# Patient Record
Sex: FEMALE | Race: BLACK OR AFRICAN AMERICAN | Employment: OTHER | URBAN - METROPOLITAN AREA
[De-identification: names, ages, dates, MRNs, and addresses within clinical notes are randomized per-mention and may not be internally consistent; named-entity substitution may affect disease eponyms.]

---

## 2022-11-07 ENCOUNTER — APPOINTMENT (OUTPATIENT)
Dept: CT IMAGING | Age: 67
End: 2022-11-07
Attending: EMERGENCY MEDICINE
Payer: MEDICARE

## 2022-11-07 ENCOUNTER — HOSPITAL ENCOUNTER (EMERGENCY)
Age: 67
Discharge: ANOTHER HOSPITAL | End: 2022-11-08
Attending: EMERGENCY MEDICINE
Payer: MEDICARE

## 2022-11-07 ENCOUNTER — APPOINTMENT (OUTPATIENT)
Dept: GENERAL RADIOLOGY | Age: 67
End: 2022-11-07
Attending: EMERGENCY MEDICINE
Payer: MEDICARE

## 2022-11-07 DIAGNOSIS — G93.89 BRAIN MASS: ICD-10-CM

## 2022-11-07 DIAGNOSIS — R56.9 SEIZURE (HCC): Primary | ICD-10-CM

## 2022-11-07 DIAGNOSIS — G93.40 ENCEPHALOPATHY: ICD-10-CM

## 2022-11-07 DIAGNOSIS — C34.91 METASTATIC PRIMARY LUNG CANCER, RIGHT (HCC): ICD-10-CM

## 2022-11-07 LAB
ALBUMIN SERPL-MCNC: 3.3 G/DL (ref 3.4–5)
ALBUMIN/GLOB SERPL: 0.8 {RATIO} (ref 0.8–1.7)
ALP SERPL-CCNC: 99 U/L (ref 45–117)
ALT SERPL-CCNC: 27 U/L (ref 13–56)
AMPHET UR QL SCN: NEGATIVE
ANION GAP SERPL CALC-SCNC: 9 MMOL/L (ref 3–18)
APAP SERPL-MCNC: <2 UG/ML (ref 10–30)
APPEARANCE UR: CLEAR
AST SERPL-CCNC: 35 U/L (ref 10–38)
BACTERIA URNS QL MICRO: NEGATIVE /HPF
BARBITURATES UR QL SCN: NEGATIVE
BASOPHILS # BLD: 0 K/UL (ref 0–0.1)
BASOPHILS NFR BLD: 0 % (ref 0–2)
BENZODIAZ UR QL: POSITIVE
BILIRUB DIRECT SERPL-MCNC: <0.1 MG/DL (ref 0–0.2)
BILIRUB SERPL-MCNC: 0.2 MG/DL (ref 0.2–1)
BILIRUB UR QL: NEGATIVE
BUN SERPL-MCNC: 21 MG/DL (ref 7–18)
BUN/CREAT SERPL: 17 (ref 12–20)
CALCIUM SERPL-MCNC: 9.3 MG/DL (ref 8.5–10.1)
CANNABINOIDS UR QL SCN: NEGATIVE
CHLORIDE SERPL-SCNC: 106 MMOL/L (ref 100–111)
CO2 SERPL-SCNC: 23 MMOL/L (ref 21–32)
COCAINE UR QL SCN: NEGATIVE
COLOR UR: YELLOW
COVID-19 RAPID TEST, COVR: NOT DETECTED
CREAT SERPL-MCNC: 1.27 MG/DL (ref 0.6–1.3)
DIFFERENTIAL METHOD BLD: ABNORMAL
EOSINOPHIL # BLD: 0.1 K/UL (ref 0–0.4)
EOSINOPHIL NFR BLD: 1 % (ref 0–5)
EPITH CASTS URNS QL MICRO: NORMAL /LPF (ref 0–5)
ERYTHROCYTE [DISTWIDTH] IN BLOOD BY AUTOMATED COUNT: 12.4 % (ref 11.6–14.5)
ETHANOL SERPL-MCNC: <3 MG/DL (ref 0–3)
GLOBULIN SER CALC-MCNC: 4.2 G/DL (ref 2–4)
GLUCOSE BLD STRIP.AUTO-MCNC: 176 MG/DL (ref 70–110)
GLUCOSE SERPL-MCNC: 123 MG/DL (ref 74–99)
GLUCOSE UR STRIP.AUTO-MCNC: NEGATIVE MG/DL
GRAN CASTS URNS QL MICRO: NORMAL /LPF
HCT VFR BLD AUTO: 45.8 % (ref 35–45)
HDSCOM,HDSCOM: ABNORMAL
HGB BLD-MCNC: 14.3 G/DL (ref 12–16)
HGB UR QL STRIP: ABNORMAL
HYALINE CASTS URNS QL MICRO: NORMAL /LPF (ref 0–2)
IMM GRANULOCYTES # BLD AUTO: 0 K/UL (ref 0–0.04)
IMM GRANULOCYTES NFR BLD AUTO: 0 % (ref 0–0.5)
KETONES UR QL STRIP.AUTO: NEGATIVE MG/DL
LACTATE BLD-SCNC: 1.55 MMOL/L (ref 0.4–2)
LEUKOCYTE ESTERASE UR QL STRIP.AUTO: NEGATIVE
LYMPHOCYTES # BLD: 7 K/UL (ref 0.9–3.6)
LYMPHOCYTES NFR BLD: 49 % (ref 21–52)
MCH RBC QN AUTO: 30.4 PG (ref 24–34)
MCHC RBC AUTO-ENTMCNC: 31.2 G/DL (ref 31–37)
MCV RBC AUTO: 97.2 FL (ref 78–100)
METHADONE UR QL: NEGATIVE
MONOCYTES # BLD: 0.4 K/UL (ref 0.05–1.2)
MONOCYTES NFR BLD: 3 % (ref 3–10)
NEUTS SEG # BLD: 6.7 K/UL (ref 1.8–8)
NEUTS SEG NFR BLD: 47 % (ref 40–73)
NITRITE UR QL STRIP.AUTO: NEGATIVE
NRBC # BLD: 0 K/UL (ref 0–0.01)
NRBC BLD-RTO: 0 PER 100 WBC
OPIATES UR QL: NEGATIVE
PCP UR QL: NEGATIVE
PH UR STRIP: 5.5 [PH] (ref 5–8)
PLATELET # BLD AUTO: 211 K/UL (ref 135–420)
PLATELET COMMENTS,PCOM: ABNORMAL
PMV BLD AUTO: 11.1 FL (ref 9.2–11.8)
POTASSIUM SERPL-SCNC: 4.2 MMOL/L (ref 3.5–5.5)
PROT SERPL-MCNC: 7.5 G/DL (ref 6.4–8.2)
PROT UR STRIP-MCNC: 300 MG/DL
RBC # BLD AUTO: 4.71 M/UL (ref 4.2–5.3)
RBC #/AREA URNS HPF: NORMAL /HPF (ref 0–5)
RBC MORPH BLD: ABNORMAL
SALICYLATES SERPL-MCNC: 2.4 MG/DL (ref 2.8–20)
SODIUM SERPL-SCNC: 138 MMOL/L (ref 136–145)
SOURCE, COVRS: NORMAL
SP GR UR REFRACTOMETRY: 1.02 (ref 1–1.03)
TROPONIN-HIGH SENSITIVITY: 7 NG/L (ref 0–54)
UROBILINOGEN UR QL STRIP.AUTO: 1 EU/DL (ref 0.2–1)
WBC # BLD AUTO: 14.2 K/UL (ref 4.6–13.2)
WBC URNS QL MICRO: NORMAL /HPF (ref 0–4)

## 2022-11-07 PROCEDURE — 71260 CT THORAX DX C+: CPT

## 2022-11-07 PROCEDURE — 87040 BLOOD CULTURE FOR BACTERIA: CPT

## 2022-11-07 PROCEDURE — 83605 ASSAY OF LACTIC ACID: CPT

## 2022-11-07 PROCEDURE — 70450 CT HEAD/BRAIN W/O DYE: CPT

## 2022-11-07 PROCEDURE — 70496 CT ANGIOGRAPHY HEAD: CPT

## 2022-11-07 PROCEDURE — 80307 DRUG TEST PRSMV CHEM ANLYZR: CPT

## 2022-11-07 PROCEDURE — 74011000258 HC RX REV CODE- 258: Performed by: EMERGENCY MEDICINE

## 2022-11-07 PROCEDURE — 74011250637 HC RX REV CODE- 250/637: Performed by: EMERGENCY MEDICINE

## 2022-11-07 PROCEDURE — 80048 BASIC METABOLIC PNL TOTAL CA: CPT

## 2022-11-07 PROCEDURE — 96376 TX/PRO/DX INJ SAME DRUG ADON: CPT

## 2022-11-07 PROCEDURE — 82077 ASSAY SPEC XCP UR&BREATH IA: CPT

## 2022-11-07 PROCEDURE — 96365 THER/PROPH/DIAG IV INF INIT: CPT

## 2022-11-07 PROCEDURE — 80076 HEPATIC FUNCTION PANEL: CPT

## 2022-11-07 PROCEDURE — 96366 THER/PROPH/DIAG IV INF ADDON: CPT

## 2022-11-07 PROCEDURE — 99285 EMERGENCY DEPT VISIT HI MDM: CPT

## 2022-11-07 PROCEDURE — 93005 ELECTROCARDIOGRAM TRACING: CPT

## 2022-11-07 PROCEDURE — 85025 COMPLETE CBC W/AUTO DIFF WBC: CPT

## 2022-11-07 PROCEDURE — 96368 THER/DIAG CONCURRENT INF: CPT

## 2022-11-07 PROCEDURE — 80179 DRUG ASSAY SALICYLATE: CPT

## 2022-11-07 PROCEDURE — 71045 X-RAY EXAM CHEST 1 VIEW: CPT

## 2022-11-07 PROCEDURE — 74011000636 HC RX REV CODE- 636: Performed by: EMERGENCY MEDICINE

## 2022-11-07 PROCEDURE — 96375 TX/PRO/DX INJ NEW DRUG ADDON: CPT

## 2022-11-07 PROCEDURE — 74011250636 HC RX REV CODE- 250/636: Performed by: EMERGENCY MEDICINE

## 2022-11-07 PROCEDURE — 87635 SARS-COV-2 COVID-19 AMP PRB: CPT

## 2022-11-07 PROCEDURE — 84484 ASSAY OF TROPONIN QUANT: CPT

## 2022-11-07 PROCEDURE — 81001 URINALYSIS AUTO W/SCOPE: CPT

## 2022-11-07 PROCEDURE — 80143 DRUG ASSAY ACETAMINOPHEN: CPT

## 2022-11-07 PROCEDURE — 96367 TX/PROPH/DG ADDL SEQ IV INF: CPT

## 2022-11-07 PROCEDURE — 82962 GLUCOSE BLOOD TEST: CPT

## 2022-11-07 RX ORDER — DEXAMETHASONE SODIUM PHOSPHATE 4 MG/ML
4 INJECTION, SOLUTION INTRA-ARTICULAR; INTRALESIONAL; INTRAMUSCULAR; INTRAVENOUS; SOFT TISSUE
Status: COMPLETED | OUTPATIENT
Start: 2022-11-07 | End: 2022-11-07

## 2022-11-07 RX ORDER — LEVETIRACETAM 10 MG/ML
1000 INJECTION INTRAVASCULAR EVERY 12 HOURS
Status: DISCONTINUED | OUTPATIENT
Start: 2022-11-08 | End: 2022-11-07

## 2022-11-07 RX ORDER — LORAZEPAM 2 MG/ML
1 INJECTION INTRAMUSCULAR
Status: DISCONTINUED | OUTPATIENT
Start: 2022-11-07 | End: 2022-11-08 | Stop reason: HOSPADM

## 2022-11-07 RX ORDER — DEXAMETHASONE SODIUM PHOSPHATE 4 MG/ML
4 INJECTION, SOLUTION INTRA-ARTICULAR; INTRALESIONAL; INTRAMUSCULAR; INTRAVENOUS; SOFT TISSUE EVERY 6 HOURS
Status: DISCONTINUED | OUTPATIENT
Start: 2022-11-08 | End: 2022-11-08 | Stop reason: HOSPADM

## 2022-11-07 RX ORDER — LEVETIRACETAM 10 MG/ML
1000 INJECTION INTRAVASCULAR EVERY 12 HOURS
Status: DISCONTINUED | OUTPATIENT
Start: 2022-11-08 | End: 2022-11-08 | Stop reason: HOSPADM

## 2022-11-07 RX ORDER — MORPHINE SULFATE 4 MG/ML
4 INJECTION INTRAVENOUS
Status: COMPLETED | OUTPATIENT
Start: 2022-11-07 | End: 2022-11-08

## 2022-11-07 RX ORDER — FENTANYL CITRATE 50 UG/ML
25 INJECTION, SOLUTION INTRAMUSCULAR; INTRAVENOUS ONCE
Status: COMPLETED | OUTPATIENT
Start: 2022-11-07 | End: 2022-11-07

## 2022-11-07 RX ORDER — LORAZEPAM 2 MG/ML
1 INJECTION INTRAMUSCULAR ONCE
Status: COMPLETED | OUTPATIENT
Start: 2022-11-07 | End: 2022-11-07

## 2022-11-07 RX ORDER — FENTANYL CITRATE 50 UG/ML
50 INJECTION, SOLUTION INTRAMUSCULAR; INTRAVENOUS
Status: COMPLETED | OUTPATIENT
Start: 2022-11-07 | End: 2022-11-07

## 2022-11-07 RX ORDER — SODIUM CHLORIDE 0.9 % (FLUSH) 0.9 %
5-10 SYRINGE (ML) INJECTION AS NEEDED
Status: DISCONTINUED | OUTPATIENT
Start: 2022-11-07 | End: 2022-11-08 | Stop reason: HOSPADM

## 2022-11-07 RX ORDER — LEVETIRACETAM 10 MG/ML
1000 INJECTION INTRAVASCULAR EVERY 12 HOURS
Status: DISCONTINUED | OUTPATIENT
Start: 2022-11-07 | End: 2022-11-07

## 2022-11-07 RX ORDER — ASPIRIN 300 MG/1
300 SUPPOSITORY RECTAL ONCE
Status: COMPLETED | OUTPATIENT
Start: 2022-11-07 | End: 2022-11-07

## 2022-11-07 RX ADMIN — LORAZEPAM 1 MG: 2 INJECTION INTRAMUSCULAR; INTRAVENOUS at 17:31

## 2022-11-07 RX ADMIN — LORAZEPAM 1 MG: 2 INJECTION INTRAMUSCULAR; INTRAVENOUS at 23:28

## 2022-11-07 RX ADMIN — VANCOMYCIN HYDROCHLORIDE 1000 MG: 1 INJECTION, POWDER, LYOPHILIZED, FOR SOLUTION INTRAVENOUS at 21:24

## 2022-11-07 RX ADMIN — DEXAMETHASONE SODIUM PHOSPHATE 4 MG: 4 INJECTION, SOLUTION INTRAMUSCULAR; INTRAVENOUS at 18:14

## 2022-11-07 RX ADMIN — SODIUM CHLORIDE 1000 ML: 9 INJECTION, SOLUTION INTRAVENOUS at 20:17

## 2022-11-07 RX ADMIN — ASPIRIN 300 MG: 300 SUPPOSITORY RECTAL at 17:23

## 2022-11-07 RX ADMIN — DEXAMETHASONE SODIUM PHOSPHATE 4 MG: 4 INJECTION, SOLUTION INTRAMUSCULAR; INTRAVENOUS at 23:55

## 2022-11-07 RX ADMIN — FENTANYL CITRATE 50 MCG: 50 INJECTION, SOLUTION INTRAMUSCULAR; INTRAVENOUS at 21:42

## 2022-11-07 RX ADMIN — PIPERACILLIN AND TAZOBACTAM 4.5 G: 4; .5 INJECTION, POWDER, FOR SOLUTION INTRAVENOUS at 20:45

## 2022-11-07 RX ADMIN — LORAZEPAM 1 MG: 2 INJECTION INTRAMUSCULAR; INTRAVENOUS at 18:27

## 2022-11-07 RX ADMIN — FENTANYL CITRATE 25 MCG: 50 INJECTION, SOLUTION INTRAMUSCULAR; INTRAVENOUS at 17:18

## 2022-11-07 RX ADMIN — SODIUM CHLORIDE 632 ML: 9 INJECTION, SOLUTION INTRAVENOUS at 20:47

## 2022-11-07 RX ADMIN — SODIUM CHLORIDE 1500 MG: 9 INJECTION, SOLUTION INTRAVENOUS at 17:21

## 2022-11-07 RX ADMIN — IOPAMIDOL 120 ML: 755 INJECTION, SOLUTION INTRAVENOUS at 18:57

## 2022-11-07 NOTE — ED PROVIDER NOTES
EMERGENCY DEPARTMENT HISTORY AND PHYSICAL EXAM    5:19 PM      Date: 11/7/2022  Patient Name: Yasir Ziegler  ((((((((  Janine Dew  1955))))))))    History of Presenting Illness     Chief Complaint   Patient presents with    Altered mental status                 ((((((((  Janine Dew  1955))))))))    History Provided By: Patient, Patient's Daughter, and EMS  Location/Duration/Severity/Modifying factors   HPI  This is a 70-year-old female brought to the emergency department for evaluation status post seizure-like activity. According to the patient's daughter she has been in her usual state of health until she was dropped off at work at 130. This last time the daughter saw her. EMS was called out and patient's coworkers found her with slurred speech and questionable right-sided facial droop at 350 in the afternoon. EMS arrived. Upon seeing the said the patient was encephalopathic. Acting confused and reaching for objects that were not there. They do report that the patient had a witnessed seizure-like activity of approximately 30 seconds. This was followed by a postictal period patient was given 2 half milligrams of Versed and was brought to the emergency department for evaluation. No reported trauma or fevers. Patient's daughter was present during the initial exam but she was on the phone and states the patient had been otherwise acting normally no recent change of diet or medications trauma fevers or complaints. No reported GI bleeds.   And never had similar symptoms of this in the past.      PCP: None    Current Facility-Administered Medications   Medication Dose Route Frequency Provider Last Rate Last Admin    vancomycin (VANCOCIN) 1,000 mg in 0.9% sodium chloride 250 mL (VIAL-MATE)  1,000 mg IntraVENous Q24H Deven NEWBY  mL/hr at 11/08/22 0646 1,000 mg at 11/08/22 0646    morphine injection 4 mg  4 mg IntraVENous NOW Maxine Bello MD        sodium chloride (NS) flush 5-10 mL  5-10 mL IntraVENous PRN Deven Lights K, DO        piperacillin-tazobactam (ZOSYN) 3.375 g in 0.9% sodium chloride (MBP/ADV) 100 mL MBP  3.375 g IntraVENous Q8H Deven Lights K, DO   IV Completed at 11/08/22 0647    LORazepam (ATIVAN) injection 1 mg  1 mg IntraVENous Q3H PRN Deven Lights K, DO   1 mg at 11/08/22 0653    dexamethasone (DECADRON) 4 mg/mL injection 4 mg  4 mg IntraVENous Q6H Deven Lights K, DO   4 mg at 11/08/22 9616    levETIRAcetam in saline (iso-os) (KEPPRA) infusion 1,000 mg  1,000 mg IntraVENous Q12H Rommie Louie, DO   IV Completed at 11/08/22 6962       Past History     Past Medical History:  History reviewed. No pertinent past medical history. Past Surgical History:  Past Surgical History:   Procedure Laterality Date    NEUROLOGICAL PROCEDURE UNLISTED      [place] CARDIAC SURG PROCEDURE UNLIST         Family History:  History reviewed. No pertinent family history. Social History:  Social History     Tobacco Use    Smoking status: Never     Passive exposure: Never    Smokeless tobacco: Never   Vaping Use    Vaping Use: Never used   Substance Use Topics    Alcohol use: Never    Drug use: Never       Allergies:  No Known Allergies      Review of Systems       Review of Systems  Unable to obtain due to underlying patient condition. Physical Exam   Visit Vitals  BP (!) 162/81   Pulse 79   Temp 98.4 °F (36.9 °C)   Resp 19   Ht 5' 6\" (1.676 m)   Wt 54.4 kg (120 lb)   SpO2 99%   BMI 19.37 kg/m²         Physical Exam  General: Very thin elderly female in no acute distress  HEENT: Pupils equal round reactive light, moist mucous membranes.   Extraocular movements intact  Neck: Supple, no meningismus  Chest: Tachycardic rate rhythm no murmurs rubs or gallops  Abdomen: Soft nontender nondistended no tenderness in right upper quadrant no pulsatile masses to deep palpation  Lungs: Clear to auscultation bilaterally no wheeze rales rhonchi or stridor  Back: No cervical thoracic or lumbosacral bony tenderness or step-offs. No CVA tenderness. Extremities: No unilateral leg swelling no obvious deformities or dislocations  Integument: No apparent rashes erythema contusions or petechiae  Neurologic: Cranial nerves II through XII grossly intact  Psychiatric: Alert responsive to pain and verbal stimulation. Answering questions yes and no. Diagnostic Study Results     Labs -  No results found for this or any previous visit (from the past 12 hour(s)). Radiologic Studies -   CTA HEAD NECK W CONT         CT CHEST ABD PELV W CONT   Final Result   1. Large right perihilar upper lobe lung mass is most likely primary lung   cancer.   -Confluent right suprahilar adenopathy      2. Innumerable pulmonary nodules bilaterally are most consistent with metastatic   disease. 3. Small-to-moderate right pleural effusion. 4. Extensive emphysema. 5. Medial right breast mass measuring 1.3 cm. Recommend further evaluation with   mammogram and ultrasound. 6. No definite evidence for metastatic disease in the abdomen or pelvis. 7. Moderate height loss of the T5 and T7 vertebral bodies, age-indeterminate,   recommend correlation for point tenderness. XR CHEST PORT   Final Result   1. Right upper lung field mass and multiple reticulonodular changes. Further   evaluation with CT is recommended. CT HEAD WO CONT   Final Result   2.6 cm mass in the left frontal lobe, with surrounding edema, creating moderate   local mass effect. No midline shift. Additional focus of edema more posteriorly in the left frontal lobe. No definite   mass evident here by noncontrast CT, but concerning for an occult mass. Recommend further evaluation with contrast-enhanced MRI. Augustine Flores M.D. discussed the above with Dr. Jayde Torre at 4:50 PM on   11/7/2022. Findings were acknowledged and understood. Medical Decision Making   I am the first provider for this patient.     I reviewed the vital signs, available nursing notes, past medical history, past surgical history, family history and social history. Vital Signs-Reviewed the patient's vital signs. EKG:   Patient's EKG as interpreted by me sinus tachycardia with a right bundle branch block rate 105   no ST elevation no ST depression I appreciate no acute ischemia or infarction on this EKG no old EKGs with which to compare    Records Reviewed: Nursing Notes and Old Medical Records (Time of Review: 5:19 PM)    ED Course: Progress Notes, Reevaluation, and Consults:    ED Course as of 22 1040   Mon 2022   1300 [employer] was endorsed to me at the change of shift. Patient presented with altered mental status and a witnessed seizure by EMS. Patient is been persistently encephalopathic since she is arrived here. She has received Keppra already for her seizure today. CT showed Mass with vasogenic edema. [JORDAN]   193 Full name: Jonathan Strange   : 1955 [JORDAN]    Pending CT scan results. Patient's family expresses interest in transfer to [facility] is a primary choice given patient's history of prior [employer], or potentially [facility] if that is an option. Still waiting for CT scans and would reach out to the access center to see if [facility] it will be an option for the patient if they have neurosurgical coverage. [JORDAN]    Consult placed to [facility], with family's preference being the [facility].  We will also reach out to [facility] if [facility] does not have any availability or neurosurgical coverage. If neither are suitable we may have to extend the search further out. [JORDAN]    On reassessment the patient is still encephalopathic. She is resting, tachypneic seemingly protecting her airway however. Did not interrogate the patient's mental status at this time.  [JORDAN]   3 Discussed with neurosurgeon at [facility], Dr. Harris Mercer Yousuf. Recommended Decadron 4 mg every 6 hours, continue Keppra 1000mg twice a day. He recommended transfer to [facility] although without clear explanation as to why [facility]. We did reach out to [facility] and they did not have any neuro ICU or neuro telemetry beds available. We will try [facility] the bed status is unknown. He recommended any facility with neurosurgery capabilities would be suitable. [JORDAN]   1333 Case was discussed with [facility] neurosurgery nurse practitioner Lien Napier, recommended transfer over the note in general.  Recommended medicine admit. Recommended stepdown bed. Advised that no beds are currently available however if patient remains without a bed at 10AM they recommended ER to ER transfer at that time. Again recommends a Decadron 4 mg every 6 hours. [JORDAN]   Tue Nov 08, 2022   0158 Patient has been accepted by the medicine service at [facility], under Dr. Luis Felipe Carrizales. No bed available in the stepdown unit as of yet. Plan will be to wait here until the morning. If there is no bed by the morning, then the patient will need to go ER to ER. [place] and I will touch base in the morning with both our facility and [facility] to discuss the situation. [JORDAN]      ED Course User Index  [JORDAN] Yannick Abdi DO       Provider Notes (Medical Decision Making):   MDM  This is a 50-year-old female brought to the emergency department for evaluation status post seizure-like activity. Based on patient's history and physical would be concern about possible seizure versus stroke. Other possibilities do include electrolyte abnormalities. Initial blood glucose was 184 by EMS. Patient was initially obtunded upon my initial evaluation. She had received 2.5 mg of Versed prior to arrival.  No evidence of trauma noted on examination. She underwent a stat head CT.   I did discuss the findings with radiology as well as neurology Dr. Keny Andrade, it was felt that this was most likely a mass in her left frontal lobe as opposed to a subacute stroke. Was felt that the patient most likely would have more focal neurologic deficits with the stroke as opposed to a large mass. Recommend the patient be transferred to a center with ICU and neurosurgery capability for emergent EEG and MRI and oncology evaluation. The did request the patient go to [facility] as she has received previous care there. Neurologist stated no tPA at this time. Was in agreement for CTA head and neck. I did have long discussion with patient's family at bedside. Reviewed with them the information we had at this time. They are in full agreement with the plan. They were able to answer questions regarding patient's medical history. She reports the patient does have a history of a spinal cord history of back in 1995. States that she patient does not have any allergies and is not on any medications on a regular basis. She does confirm that the patient does smoke. But does not think that patient has any other significant medical history at this time. Procedures    Critical Care Time:   CRITICAL CARE NOTE:  There was a high probability of clinically significant life-threatening deterioration of the patient's condition requiring my urgent intervention due to strokelike activity. Acute neurologic intervention was performed to address this. Total critical care time is at least  47 minutes. This includes vital sign monitoring, pulse oximetry monitoring, telemetry monitoring, clinical response to the IV medications, reviewing the nursing notes, consultation time, dictation/documentation time, and interpretation of the lab work. This time excludes time spent performing procedures and separately billable procedures and family discussion time. Diagnosis     Clinical Impression:   1. Seizure ([address]    2. Encephalopathy    3. Metastatic primary lung cancer, right ([address]    4.  Brain mass        Disposition: Transfer for admission    Follow-up Information    None          Patient's Medications    No medications on file     Disclaimer: Sections of this note are dictated using utilizing voice recognition software. Minor typographical errors may be present. If questions arise, please do not hesitate to contact me or call our department. [phone]  [id number]    I assumed care of this patient from the overnight physician Dr. hCristopher Miller. Please see his dictated note for earlier visit information and details. My evaluation patient was resting comfortably. Still having intermittent low back pain. She was given morphine for this. Currently awaiting acceptance over at [facility] for continuation of care and treatment. Review patient's imaging studies from last night. As well as the current medications. She is on Keppra as well as antibiotics. Patient was given Decadron 4 mg to help with the vasogenic edema from the brain metastases. Did discuss the case with Dr. Carr, emergency physician at [facility] emergency department. Reviewed patient's imaging studies laboratory work and clinical presentation with him. He did accept the patient to that facility for continuation of care and treatment. Clinical impression 1 lung cancer with metastases 2. Seizure disorder  3.   Metastatic brain cancer

## 2022-11-07 NOTE — ED NOTES
[facility] EMERGENCY DEPARTMENT TRANSFER OF CARE         Patient care Handed off from Dr Janice Diaz to Emilee Schumacher,    @ 5:59 PM     Discussed the patient's complaint, presentation, vitals and differential diagnosis. Discussed the patient's current status, and response to treatments  Reviewed critical lab values, allergies, and other factors. Rounded at the patient's bedside, the patient and family's questions and concerns were addressed. Issues or problems that are still being evaluated are: Reviewed the case with the outgoing physician, Dr. Janice Diaz. Patient with encephalopathy and seizure preceding her arrival in the ED. Found to have a brain mass. I will add on CT chest abdomen pelvis to assess for primary. See the ED course section. Antibiotics ordered by me and possibility of obstructive pneumonia given extensive pulmonary mass and metastases, leukocytosis and tachycardia. Recent Results (from the past 24 hour(s))   CULTURE, BLOOD    Collection Time: 11/07/22  8:05 PM    Specimen: Blood   Result Value Ref Range    Special Requests: NO SPECIAL REQUESTS      Culture result: NO GROWTH AFTER 8 HOURS     POC LACTIC ACID    Collection Time: 11/07/22  8:10 PM   Result Value Ref Range    Lactic Acid (POC) 1.55 0.40 - 2.00 mmol/L   CULTURE, BLOOD    Collection Time: 11/07/22  8:23 PM    Specimen: Blood   Result Value Ref Range    Special Requests: NO SPECIAL REQUESTS      Culture result: NO GROWTH AFTER 8 HOURS     COVID-19 RAPID TEST    Collection Time: 11/07/22  9:05 PM   Result Value Ref Range    Specimen source Nasopharyngeal      COVID-19 rapid test Not detected NOTD         CTA HEAD NECK W CONT         CT CHEST ABD PELV W CONT   Final Result   1. Large right perihilar upper lobe lung mass is most likely primary lung   cancer.   -Confluent right suprahilar adenopathy      2. Innumerable pulmonary nodules bilaterally are most consistent with metastatic   disease.       3. Small-to-moderate right pleural effusion. 4. Extensive emphysema. 5. Medial right breast mass measuring 1.3 cm. Recommend further evaluation with   mammogram and ultrasound. 6. No definite evidence for metastatic disease in the abdomen or pelvis. 7. Moderate height loss of the T5 and T7 vertebral bodies, age-indeterminate,   recommend correlation for point tenderness. XR CHEST PORT   Final Result   1. Right upper lung field mass and multiple reticulonodular changes. Further   evaluation with CT is recommended. CT HEAD WO CONT   Final Result   2.6 cm mass in the left frontal lobe, with surrounding edema, creating moderate   local mass effect. No midline shift. Additional focus of edema more posteriorly in the left frontal lobe. No definite   mass evident here by noncontrast CT, but concerning for an occult mass. Recommend further evaluation with contrast-enhanced MRI. Bright Ocampo M.D. discussed the above with Dr. Lev Lentz at 4:50 PM on   2022. Findings were acknowledged and understood. ED Course as of 22 1857   Mon 2022   1845 Care was endorsed to me at the change of shift. Patient presented with altered mental status and a witnessed seizure by EMS. Patient is been persistently encephalopathic since she is arrived here. She has received Keppra already for her seizure today. CT showed Mass with vasogenic edema. [JORDAN]   193 Full name: Cielo Bernal   : 1955 [JORDAN]    Pending CT scan results. Patient's family expresses interest in transfer to [facility] is a primary choice given patient's history of prior [employer], or potentially [facility] if that is an option. Still waiting for CT scans and would reach out to the access center to see if [facility] it will be an option for the patient if they have neurosurgical coverage.  [JORDAN]    Consult placed to [facility], with family's preference being the [facility].  We will also reach out to [facility] if [facility] does not have any availability or neurosurgical coverage. If neither are suitable we may have to extend the search further out. [JORDAN]   2133 On reassessment the patient is still encephalopathic. She is resting, tachypneic seemingly protecting her airway however. Did not interrogate the patient's mental status at this time. [JORDAN]   2158 Discussed with neurosurgeon at [facility], Dr. Daphney Bar. Recommended Decadron 4 mg every 6 hours, continue Keppra 1000mg twice a day. He recommended transfer to [facility] although without clear explanation as to why [facility]. We did reach out to [facility] and they did not have any neuro ICU or neuro telemetry beds available. We will try [facility] the bed status is unknown. He recommended any facility with neurosurgery capabilities would be suitable. [JORDAN]   2892 Case was discussed with [facility] neurosurgery nurse practitioner Priyanka Coburn, recommended transfer over the note in general.  Recommended medicine admit. Recommended stepdown bed. Advised that no beds are currently available however if patient remains without a bed at 10AM they recommended ER to ER transfer at that time. Again recommends a Decadron 4 mg every 6 hours. [JORDAN]   Tue Nov 08, 2022   0158 Patient has been accepted by the medicine service at [facility], under Dr. Antoinette Farmer. No bed available in the stepdown unit as of yet. Plan will be to wait here until the morning. If there is no bed by the morning, then the patient will need to go ER to ER. [place] and I will touch base in the morning with both our facility and [facility] to discuss the situation. [JORDAN]      ED Course User Index  [JORDAN] Marely Wright, DO         Assessment/Plan-     Patient was accepted by the hospitalist.  Dr. Alexia Marx updated with the findings upon return at shift change. Plan for transfer to [facility]    Diagnosis:   1. Seizure ([address]    2. Encephalopathy    3. Metastatic primary lung cancer, right ([address]    4. Brain mass          Disposition: Transfer    Follow-up Information    None         There are no discharge medications for this patient.         6:59 PM, 11/8/2022, Estrada Herrera DO

## 2022-11-07 NOTE — ED TRIAGE NOTES
Per EMS patient was at work and she became unresponsive and 911 was called. Per EMS patient had a seizure and was administered Versed.

## 2022-11-07 NOTE — ED NOTES
Patient arrives to the ED with a possible stroke.   Code S called at 21 , patient taken to CT at 1625, Tele neuro called at 1628, Patient returns from [address] at [address], Dr. Luiz Corrigan called back at [phone], Dr Luiz Corrigan on Monitor at [phone], Dr Luiz Corrigan called back at [phone] to talk with provider about neuro exam and admission to hospital.

## 2022-11-07 NOTE — PROGRESS NOTES
Pharmacy Note     Zosyn was ordered for the treatment of aspiration pneumonia. Per [facility] Policy, Zosyn 3.3TT IV q6h over 30 min will be changed to Zosyn 4.5gm IV x 1 over 30 min followed by Zosyn 3.375gm IV q8h over 240 min. Estimated Creatinine Clearance: Estimated Creatinine Clearance: 9.1 mL/min (based on SCr of 1.27 mg/dL). Age unknown at this time - please dose adjust as appropriate for renal function once updated      BMI:  Body mass index is 19.37 kg/m². Rationale for Adjustment:  [facility] B-Lactam extended infusion policy    Pharmacy will continue to monitor and adjust dose as necessary. Please call with any questions. Thank you,  Ana.  Haroon Mclaughlin

## 2022-11-08 VITALS
OXYGEN SATURATION: 98 % | DIASTOLIC BLOOD PRESSURE: 81 MMHG | WEIGHT: 120 LBS | BODY MASS INDEX: 19.29 KG/M2 | SYSTOLIC BLOOD PRESSURE: 162 MMHG | HEIGHT: 66 IN | RESPIRATION RATE: 18 BRPM | HEART RATE: 78 BPM | TEMPERATURE: 98.4 F

## 2022-11-08 PROCEDURE — 96376 TX/PRO/DX INJ SAME DRUG ADON: CPT

## 2022-11-08 PROCEDURE — 96365 THER/PROPH/DIAG IV INF INIT: CPT

## 2022-11-08 PROCEDURE — 96366 THER/PROPH/DIAG IV INF ADDON: CPT

## 2022-11-08 PROCEDURE — 96375 TX/PRO/DX INJ NEW DRUG ADDON: CPT

## 2022-11-08 PROCEDURE — 74011250636 HC RX REV CODE- 250/636: Performed by: EMERGENCY MEDICINE

## 2022-11-08 PROCEDURE — 74011000258 HC RX REV CODE- 258: Performed by: EMERGENCY MEDICINE

## 2022-11-08 RX ORDER — MORPHINE SULFATE 4 MG/ML
4 INJECTION INTRAVENOUS
Status: COMPLETED | OUTPATIENT
Start: 2022-11-08 | End: 2022-11-08

## 2022-11-08 RX ORDER — DEXAMETHASONE SODIUM PHOSPHATE 4 MG/ML
4 INJECTION, SOLUTION INTRA-ARTICULAR; INTRALESIONAL; INTRAMUSCULAR; INTRAVENOUS; SOFT TISSUE
Status: DISCONTINUED | OUTPATIENT
Start: 2022-11-08 | End: 2022-11-08

## 2022-11-08 RX ADMIN — VANCOMYCIN HYDROCHLORIDE 1000 MG: 1 INJECTION, POWDER, LYOPHILIZED, FOR SOLUTION INTRAVENOUS at 06:46

## 2022-11-08 RX ADMIN — MORPHINE SULFATE 4 MG: 4 INJECTION INTRAVENOUS at 01:09

## 2022-11-08 RX ADMIN — MORPHINE SULFATE 4 MG: 4 INJECTION INTRAVENOUS at 10:54

## 2022-11-08 RX ADMIN — PIPERACILLIN AND TAZOBACTAM 3.38 G: 3; .375 INJECTION, POWDER, FOR SOLUTION INTRAVENOUS at 05:03

## 2022-11-08 RX ADMIN — LORAZEPAM 1 MG: 2 INJECTION INTRAMUSCULAR; INTRAVENOUS at 06:53

## 2022-11-08 RX ADMIN — LEVETIRACETAM 1000 MG: 10 INJECTION INTRAVENOUS at 06:24

## 2022-11-08 RX ADMIN — DEXAMETHASONE SODIUM PHOSPHATE 4 MG: 4 INJECTION, SOLUTION INTRAMUSCULAR; INTRAVENOUS at 06:24

## 2022-11-08 NOTE — PROGRESS NOTES
[facility] [facility] Pharmacokinetic Monitoring Service - Vancomycin     Karen Taylor is a 81 y.o. female starting on vancomycin therapy for Sepsis of Unknown Etiology. Pharmacy consulted by Dr. Shavon Herrera for monitoring and adjustment. Target Concentration: Dosing based on anticipated concentration <15 mg/L    Additional Antimicrobials: Piperacillin/Tazobactam    Pertinent Laboratory Values:   Temp: 98.4 °F (36.9 °C)  Weight: 54.4 kg (120 lb)  Recent Labs     11/07/22  1751 11/07/22  1639   CREA 1.27  --    BUN 21*  --    WBC  --  14.2*     Estimated Creatinine Clearance: 9.1 mL/min (based on SCr of 1.27 mg/dL). Plan:  Dose by levels for now; scheduled dosing once age updated & calculated CrCl better represents pt's renal function  Loading dose of Vancomycin 1000 mg x 1 today  BMP for tomorrow AM   Vancomycin concentration ordered with AM labs   Pharmacy will continue to monitor patient and adjust therapy as indicated    Thank you for the consult,  Ayo Painter.  NIKIA Huston  11/7/2022

## 2022-11-08 NOTE — ED NOTES
Pt now opens eyes when RN walks in room/speaks to patient, pt looks RN in eyes. Answers \"fine\" when asked Ariadne Furry are you? \". And questioned \"what is that\" when administering IV medications, but remains unable to say name when asked or perform any tasks asked. Pt moving RUE and RLE well, some movement to LUE, none observed in LLE. Pt resting more comfortably in bed at this time. Pts daughters remain at bedside.

## 2022-11-08 NOTE — PROGRESS NOTES
Pharmacy Pharmacokinetic Monitoring Service - Vancomycin    Consulting Provider: Dr. Liat Donis DO   Indication: Sepsis of Unknown Etiology  Target Concentration: Goal AUC/RUTHY 400-600 mg*hr/L  Day of Therapy: 2  Additional Antimicrobials: Piperacillin/Tazobactam    Pertinent Laboratory Values:   Temp: 98.4 °F (36.9 °C)  Weight: 54.4 kg (120 lb)  Recent Labs     11/07/22  1751 11/07/22  1639   CREA 1.27  --    BUN 21*  --    WBC  --  14.2*     No results for input(s): VANCP, VANCT, VANCR, VANRA in the last 72 hours. Estimated Creatinine Clearance: 37.4 mL/min (based on SCr of 1.27 mg/dL). Pertinent Cultures:  Date/Time Order Name Specimen Source Lab Status   11/7/22 2105 COVID-19 RAPID TEST Nasopharyngeal Not detected   11/7/22 2023 CULTURE, BLOOD Blood In process   11/7/22 2005 CULTURE, BLOOD Blood In process     MRSA Nasal Swab: N/A. Non-respiratory infection    Assessment:  Date/Time Current Dose Concentration Timing of Concentration (h) AUC   11/07 1000 mg x1 - - -   Note: Serum concentrations collected for AUC dosing may appear elevated if collected in close proximity to the dose administered, this is not necessarily an indication of toxicity    Plan:  Dosing recommendations based on Bayesian software  Increase dose to 1000 mg IV q24h starting now.   Anticipated AUC of 536 mg/L.hr and trough concentration of 16.5 mg/L at steady state  Renal labs as indicated   Vancomycin concentration re-ordered for AM labs tomorrow 11/09  Pharmacy will continue to monitor patient and adjust therapy as indicated    Thank you for the consult,    Ivanna Jones, [facility]  11/8/2022

## 2022-11-08 NOTE — ED NOTES
Pt awoke, crying out presumably in pain and reaching with R arm to lower back. Area inspected, no discoloration of skin noted. Pt did not appear to be in greater pain when palpating the area. Attempted to talk with patient and prompted pt to give verbal response, pt unable at this time. Pt given 50mcg of fentanyl, per MAR, and then resumed a position of comfort. MD gaona.

## 2022-11-09 LAB
ATRIAL RATE: 105 BPM
CALCULATED P AXIS, ECG09: 77 DEGREES
CALCULATED R AXIS, ECG10: -3 DEGREES
CALCULATED T AXIS, ECG11: 9 DEGREES
DIAGNOSIS, 93000: NORMAL
P-R INTERVAL, ECG05: 122 MS
Q-T INTERVAL, ECG07: 384 MS
QRS DURATION, ECG06: 118 MS
QTC CALCULATION (BEZET), ECG08: 507 MS
VENTRICULAR RATE, ECG03: 105 BPM

## 2022-11-13 LAB
BACTERIA SPEC CULT: NORMAL
BACTERIA SPEC CULT: NORMAL
SERVICE CMNT-IMP: NORMAL
SERVICE CMNT-IMP: NORMAL

## 2022-12-11 ENCOUNTER — APPOINTMENT (OUTPATIENT)
Dept: CT IMAGING | Age: 67
End: 2022-12-11
Attending: EMERGENCY MEDICINE
Payer: MEDICARE

## 2022-12-11 ENCOUNTER — HOSPITAL ENCOUNTER (EMERGENCY)
Age: 67
Discharge: HOME | End: 2022-12-11
Attending: EMERGENCY MEDICINE | Admitting: EMERGENCY MEDICINE
Payer: MEDICARE

## 2022-12-11 VITALS
WEIGHT: 115 LBS | DIASTOLIC BLOOD PRESSURE: 73 MMHG | TEMPERATURE: 98.3 F | OXYGEN SATURATION: 100 % | SYSTOLIC BLOOD PRESSURE: 107 MMHG | HEART RATE: 74 BPM | HEIGHT: 66 IN | RESPIRATION RATE: 20 BRPM | BODY MASS INDEX: 18.48 KG/M2

## 2022-12-11 DIAGNOSIS — R56.9 FOCAL SEIZURE (HCC): Primary | ICD-10-CM

## 2022-12-11 LAB
ANION GAP SERPL CALC-SCNC: 7 MMOL/L (ref 3–18)
BASOPHILS # BLD: 0 K/UL (ref 0–0.1)
BASOPHILS NFR BLD: 0 % (ref 0–2)
BUN SERPL-MCNC: 27 MG/DL (ref 7–18)
BUN/CREAT SERPL: 35 (ref 12–20)
CALCIUM SERPL-MCNC: 8.9 MG/DL (ref 8.5–10.1)
CHLORIDE SERPL-SCNC: 100 MMOL/L (ref 100–111)
CO2 SERPL-SCNC: 25 MMOL/L (ref 21–32)
CREAT SERPL-MCNC: 0.78 MG/DL (ref 0.6–1.3)
DIFFERENTIAL METHOD BLD: ABNORMAL
EOSINOPHIL # BLD: 0 K/UL (ref 0–0.4)
EOSINOPHIL NFR BLD: 0 % (ref 0–5)
ERYTHROCYTE [DISTWIDTH] IN BLOOD BY AUTOMATED COUNT: 13 % (ref 11.6–14.5)
GLUCOSE SERPL-MCNC: 128 MG/DL (ref 74–99)
HCT VFR BLD AUTO: 45.3 % (ref 35–45)
HGB BLD-MCNC: 15.3 G/DL (ref 12–16)
IMM GRANULOCYTES # BLD AUTO: 0.1 K/UL (ref 0–0.04)
IMM GRANULOCYTES NFR BLD AUTO: 1 % (ref 0–0.5)
LYMPHOCYTES # BLD: 1.2 K/UL (ref 0.9–3.6)
LYMPHOCYTES NFR BLD: 13 % (ref 21–52)
MCH RBC QN AUTO: 30.6 PG (ref 24–34)
MCHC RBC AUTO-ENTMCNC: 33.8 G/DL (ref 31–37)
MCV RBC AUTO: 90.6 FL (ref 78–100)
MONOCYTES # BLD: 0.2 K/UL (ref 0.05–1.2)
MONOCYTES NFR BLD: 3 % (ref 3–10)
NEUTS SEG # BLD: 7.2 K/UL (ref 1.8–8)
NEUTS SEG NFR BLD: 83 % (ref 40–73)
NRBC # BLD: 0 K/UL (ref 0–0.01)
NRBC BLD-RTO: 0 PER 100 WBC
PLATELET # BLD AUTO: 173 K/UL (ref 135–420)
PMV BLD AUTO: 8.8 FL (ref 9.2–11.8)
POTASSIUM SERPL-SCNC: 4.5 MMOL/L (ref 3.5–5.5)
RBC # BLD AUTO: 5 M/UL (ref 4.2–5.3)
SODIUM SERPL-SCNC: 132 MMOL/L (ref 136–145)
WBC # BLD AUTO: 8.6 K/UL (ref 4.6–13.2)

## 2022-12-11 PROCEDURE — 85025 COMPLETE CBC W/AUTO DIFF WBC: CPT

## 2022-12-11 PROCEDURE — 70450 CT HEAD/BRAIN W/O DYE: CPT

## 2022-12-11 PROCEDURE — 99284 EMERGENCY DEPT VISIT MOD MDM: CPT

## 2022-12-11 PROCEDURE — 80048 BASIC METABOLIC PNL TOTAL CA: CPT

## 2022-12-11 NOTE — Clinical Note
[address] [facility] EMERGENCY DEPT  [address] [id number] [phone]    Work/School Note    Date: 12/11/2022    To Whom It May concern:      Anastasiya Mckeon was seen and treated today in the emergency room by the following provider(s):  Attending Provider: Sergio Fernandez MD  Resident: Duy Otto MD.      Anastasiya Mckeon is excused from work/school on 12/11/22. She is clear to return to work/school on 12/12/22. Please excuse Phillipkalyan Lia and Zander Kiara from work today on 12/11/22 as the accompanied the patient to the Emergency room for the duration of her care.      Sincerely,          Piter Archibald MD

## 2022-12-11 NOTE — ED NOTES
EMERGENCY DEPARTMENT HISTORY AND PHYSICAL EXAM    4:27 PM      Date: (Not on file)  Patient Name: Cristian Kingsley    History of Presenting Illness     Chief Complaint   Patient presents with    Fatigue         History Provided By: patient    Additional History (Context): Cristian Kingsley is a 78 y.o. female presents with seizure like activity. Patient has a known history of brain metastasis that patient has been undergoing treatment for under the care of Dr. Tori Ashford with [facility]. Patient states that today her eyes rolled back in her head and patient became weak during a seizure like episode that was witnessed by her daughter. Patient was noted to have had NO tremors, not LOC, and was able to talk to daughter during the episode. Patient did seem to be post-ictal after per daughter who states she was very hot, weak, and unable to sit up. Patient denies any aura or sign the episode was oncoming. Patient denies any fevers, chills, n/v, dysphagia. Patient states this has not happened before exactly but has had similar episode with the same symptoms except with tremors on 11/7. Patient states she is back to baseline outside of some fatigue and a desire to leave. Patient has next appointment with Oncology on tomorrow (12/12)     PCP: None          Past History     Past Medical History:  Past Medical History:   Diagnosis Date    Hypertension     Lung cancer ([facility] [address]        Past Surgical History:  Past Surgical History:   Procedure Laterality Date    NEUROLOGICAL PROCEDURE UNLISTED      CO CARDIAC SURG PROCEDURE UNLIST         Family History:  History reviewed. No pertinent family history.     Social History:  Social History     Tobacco Use    Smoking status: Every Day     Packs/day: 0.25     Types: Cigarettes     Passive exposure: Never    Smokeless tobacco: Never   Vaping Use    Vaping Use: Never used   Substance Use Topics    Alcohol use: Never    Drug use: Never       Allergies:  No Known Allergies      Review of Systems     Review of Systems   Constitutional:  Positive for fatigue. Negative for chills and fever. HENT:  Negative for trouble swallowing. Respiratory:  Negative for choking. Gastrointestinal:  Negative for nausea and vomiting. Musculoskeletal:  Negative for myalgias. Skin:  Negative for rash. Neurological:  Positive for seizures and weakness. Negative for tremors and syncope. Psychiatric/Behavioral:  Negative for behavioral problems. Physical Exam       Patient Vitals for the past 12 hrs:   Temp Pulse Resp BP SpO2   12/11/22 1504 98.3 °F (36.8 °C) 74 20 107/73 100 %       IPVITALS  Patient Vitals for the past 24 hrs:   BP Temp Pulse Resp SpO2 Height Weight   12/11/22 1504 107/73 98.3 °F (36.8 °C) 74 20 100 % 5' 6\" (1.676 m) 52.2 kg (115 lb)       Physical Exam  Constitutional:       General: She is not in acute distress. Eyes:      Pupils: Pupils are equal, round, and reactive to light. Cardiovascular:      Rate and Rhythm: Normal rate and regular rhythm. Pulmonary:      Effort: Pulmonary effort is normal. No respiratory distress. Abdominal:      General: There is no distension. Tenderness: There is no abdominal tenderness. Musculoskeletal:         General: Normal range of motion. Neurological:      Mental Status: She is oriented to person, place, and time. Diagnostic Study Results   Labs -  No results found for this or any previous visit (from the past 24 hour(s)). Radiologic Studies -   CT HEAD WO CONT   Final Result   1. Interval decrease in the size of a left frontal mass and surrounding   vasogenic edema. Additional focus of vasogenic edema in the superior left   frontal lobe has also decreased from prior. 2.  No intraparenchymal hemorrhage or mass effect. CT HEAD WO CONT    Result Date: 12/11/2022  HISTORY: Seizure today. Known brain metastases EXAM: CT head. Comparison made to November 7, 2022 study.  TECHNIQUE: Unenhanced spiral images of the head were performed from skullbase to vertex with coronal and sagittal reconstruction. No prior studies were performed for comparison. All CT scans at this facility are performed using dose optimization technique as appropriate to a performed exam, to include automated exposure control, adjustment of the mA and/or kV according to patient size (including appropriate matching for site-specific examinations), or use of iterative reconstruction technique. FINDINGS: Interval decrease in the size of known left frontal metastasis, which measures 1.3 x 1.2 cm compared to 2.6 x 2.5 cm on the prior. Similar decrease in surrounding vasogenic edema. Additional hypodense region in the superior left frontal lobe is also decreased in size from prior. No definite new regions of vasogenic edema, although limited assessment on noncontrast CT scan. No intraparenchymal hemorrhage. No midline shift. Basilar cisterns are preserved. Unchanged ventricle size. 1.  Interval decrease in the size of a left frontal mass and surrounding vasogenic edema. Additional focus of vasogenic edema in the superior left frontal lobe has also decreased from prior. 2.  No intraparenchymal hemorrhage or mass effect. Medications ordered:   Medications - No data to display      Medical Decision Making   Initial Medical Decision Making and DDx:  78 y.o. female presents with seizure like activity. Patient has a known history of brain metastasis and arrived on to the ED in NAD and endorsing that she is at her baseline. CT head scan was completed today that showed no new lesions and some decrease in the size of the mass and edema that is present. ED Course: Progress Notes, Reevaluation, and Consults:     Due to characteristics of the episode patient likely experienced a focal seizure likely 2/2 to current brain pathology.  CT head scan was completed today that showed no new lesions and some decrease in the size of the mass and edema that is present. CBC and BMP were obtained prior to discharge and were unremarkable. Patient provided with instructions to ensure follow up with her oncologist as scheduled tomorrow. I am the first provider for this patient. I reviewed the vital signs, available nursing notes, past medical history, past surgical history, family history and social history. Patient Vitals for the past 12 hrs:   Temp Pulse Resp BP SpO2   12/11/22 1504 98.3 °F (36.8 °C) 74 20 107/73 100 %       Vital Signs-Reviewed the patient's vital signs. Pulse Oximetry Analysis, Cardiac Monitor, 12 lead ekg:      Interpreted by the EP. Records Reviewed: Nursing notes reviewed (Time of Review: 4:27 PM)    Critical Care Time: 0  If critical care time is note it is exclusive of any separately billable procedures. Diagnosis     Clinical Impression: No diagnosis found.     Disposition: Home      Follow-up Information    None          Patient's Medications    No medications on file     _______________________________    Notes:    Reji Gonzalez MD using Dragon dictation      _______________________________

## 2022-12-11 NOTE — Clinical Note
[address] [facility] EMERGENCY DEPT  [id number] [facility] [id number]  [phone]    Work/School Note    Date: 12/11/2022    To Whom It May concern:      Byron Page was seen and treated today in the emergency room by the following provider(s):  Attending Provider: An Escalera MD  Resident: Merlyn Reyes MD.      Byron Page is excused from work/school on 12/11/22. She is clear to return to work/school on 12/12/22.         Sincerely,          Lianne Kelly MD

## 2022-12-11 NOTE — Clinical Note
Glenny Timur was seen and treated in our emergency department on 12/11/2022.         Trenna Phoenix, MD

## 2022-12-11 NOTE — ED TRIAGE NOTES
Pt was at home earlier, called her daughter's name and then fell back on bed. Eyes rolled back.  Did not shake, no LOC, was talking to her daughter while episode was happeneing

## 2022-12-15 ENCOUNTER — TRANSCRIBE ORDER (OUTPATIENT)
Dept: SCHEDULING | Age: 67
End: 2022-12-15

## 2022-12-15 DIAGNOSIS — R91.1 LUNG NODULE: ICD-10-CM

## 2022-12-15 DIAGNOSIS — C34.90 NON-SMALL CELL LUNG CANCER WITH METASTASIS (HCC): ICD-10-CM

## 2022-12-15 DIAGNOSIS — C34.90 MALIGNANT NEOPLASM OF BRONCHUS AND LUNG (HCC): Primary | ICD-10-CM

## 2022-12-19 ENCOUNTER — TRANSCRIBE ORDER (OUTPATIENT)
Dept: CARDIAC CATH/INVASIVE PROCEDURES | Age: 67
End: 2022-12-19

## 2022-12-19 DIAGNOSIS — C34.90 NON-SMALL CELL LUNG CANCER, UNSPECIFIED LATERALITY (HCC): Primary | ICD-10-CM

## 2022-12-28 ENCOUNTER — HOSPITAL ENCOUNTER (OUTPATIENT)
Dept: INTERVENTIONAL RADIOLOGY/VASCULAR | Age: 67
Discharge: HOME | End: 2022-12-28
Attending: RADIOLOGY | Admitting: RADIOLOGY
Payer: MEDICARE

## 2022-12-28 VITALS
SYSTOLIC BLOOD PRESSURE: 126 MMHG | HEART RATE: 69 BPM | DIASTOLIC BLOOD PRESSURE: 78 MMHG | RESPIRATION RATE: 22 BRPM | OXYGEN SATURATION: 98 %

## 2022-12-28 DIAGNOSIS — C34.90 NON-SMALL CELL LUNG CANCER, UNSPECIFIED LATERALITY (HCC): ICD-10-CM

## 2022-12-28 LAB
ANION GAP SERPL CALC-SCNC: 0 MMOL/L (ref 3–18)
APTT PPP: 24.5 SEC (ref 23–36.4)
BUN SERPL-MCNC: 20 MG/DL (ref 7–18)
BUN/CREAT SERPL: 33 (ref 12–20)
CALCIUM SERPL-MCNC: 9.7 MG/DL (ref 8.5–10.1)
CHLORIDE SERPL-SCNC: 102 MMOL/L (ref 100–111)
CO2 SERPL-SCNC: 32 MMOL/L (ref 21–32)
CREAT SERPL-MCNC: 0.6 MG/DL (ref 0.6–1.3)
ERYTHROCYTE [DISTWIDTH] IN BLOOD BY AUTOMATED COUNT: 13.2 % (ref 11.6–14.5)
GLUCOSE SERPL-MCNC: 102 MG/DL (ref 74–99)
HCT VFR BLD AUTO: 41.1 % (ref 35–45)
HGB BLD-MCNC: 14.1 G/DL (ref 12–16)
INR PPP: 0.9 (ref 0.8–1.2)
MCH RBC QN AUTO: 30.7 PG (ref 24–34)
MCHC RBC AUTO-ENTMCNC: 34.3 G/DL (ref 31–37)
MCV RBC AUTO: 89.3 FL (ref 78–100)
NRBC # BLD: 0 K/UL (ref 0–0.01)
NRBC BLD-RTO: 0 PER 100 WBC
PLATELET # BLD AUTO: 141 K/UL (ref 135–420)
PMV BLD AUTO: 9.7 FL (ref 9.2–11.8)
POTASSIUM SERPL-SCNC: 4 MMOL/L (ref 3.5–5.5)
PROTHROMBIN TIME: 12.7 SEC (ref 11.5–15.2)
RBC # BLD AUTO: 4.6 M/UL (ref 4.2–5.3)
SODIUM SERPL-SCNC: 134 MMOL/L (ref 136–145)
WBC # BLD AUTO: 6.7 K/UL (ref 4.6–13.2)

## 2022-12-28 PROCEDURE — 85610 PROTHROMBIN TIME: CPT

## 2022-12-28 PROCEDURE — 36561 INSERT TUNNELED CV CATH: CPT

## 2022-12-28 PROCEDURE — 85027 COMPLETE CBC AUTOMATED: CPT

## 2022-12-28 PROCEDURE — 85730 THROMBOPLASTIN TIME PARTIAL: CPT

## 2022-12-28 PROCEDURE — 80048 BASIC METABOLIC PNL TOTAL CA: CPT

## 2022-12-28 PROCEDURE — 74011000250 HC RX REV CODE- 250: Performed by: RADIOLOGY

## 2022-12-28 PROCEDURE — 74011250636 HC RX REV CODE- 250/636: Performed by: RADIOLOGY

## 2022-12-28 RX ORDER — HEPARIN SODIUM (PORCINE) LOCK FLUSH IV SOLN 100 UNIT/ML 100 UNIT/ML
500 SOLUTION INTRAVENOUS AS NEEDED
Status: DISCONTINUED | OUTPATIENT
Start: 2022-12-28 | End: 2022-12-28 | Stop reason: HOSPADM

## 2022-12-28 RX ORDER — FENTANYL CITRATE 50 UG/ML
12.5-1 INJECTION, SOLUTION INTRAMUSCULAR; INTRAVENOUS
Status: DISCONTINUED | OUTPATIENT
Start: 2022-12-28 | End: 2022-12-28 | Stop reason: HOSPADM

## 2022-12-28 RX ORDER — SODIUM CHLORIDE 9 MG/ML
20 INJECTION, SOLUTION INTRAVENOUS CONTINUOUS
Status: DISCONTINUED | OUTPATIENT
Start: 2022-12-28 | End: 2022-12-28 | Stop reason: HOSPADM

## 2022-12-28 RX ORDER — FLUMAZENIL 0.1 MG/ML
0.2 INJECTION INTRAVENOUS AS NEEDED
Status: DISCONTINUED | OUTPATIENT
Start: 2022-12-28 | End: 2022-12-28 | Stop reason: HOSPADM

## 2022-12-28 RX ORDER — NALOXONE HYDROCHLORIDE 0.4 MG/ML
0.2 INJECTION, SOLUTION INTRAMUSCULAR; INTRAVENOUS; SUBCUTANEOUS AS NEEDED
Status: DISCONTINUED | OUTPATIENT
Start: 2022-12-28 | End: 2022-12-28 | Stop reason: HOSPADM

## 2022-12-28 RX ORDER — MIDAZOLAM HYDROCHLORIDE 1 MG/ML
.5-2 INJECTION, SOLUTION INTRAMUSCULAR; INTRAVENOUS
Status: DISCONTINUED | OUTPATIENT
Start: 2022-12-28 | End: 2022-12-28 | Stop reason: HOSPADM

## 2022-12-28 RX ADMIN — CEFAZOLIN 1 G: 1 INJECTION, POWDER, FOR SOLUTION INTRAMUSCULAR; INTRAVENOUS at 09:02

## 2022-12-28 RX ADMIN — HEPARIN SODIUM (PORCINE) LOCK FLUSH IV SOLN 100 UNIT/ML 500 UNITS: 100 SOLUTION at 09:15

## 2022-12-28 RX ADMIN — MIDAZOLAM 1 MG: 1 INJECTION INTRAMUSCULAR; INTRAVENOUS at 08:50

## 2022-12-28 RX ADMIN — FENTANYL CITRATE 50 MCG: 50 INJECTION, SOLUTION INTRAMUSCULAR; INTRAVENOUS at 08:50

## 2022-12-28 RX ADMIN — LIDOCAINE HYDROCHLORIDE 15 MG: 10; .005 INJECTION, SOLUTION EPIDURAL; INFILTRATION; INTRACAUDAL; PERINEURAL at 08:55

## 2022-12-28 NOTE — ROUTINE PROCESS
TRANSFER - OUT REPORT:    Verbal report given to Vesta Sam RN(name) on [facility]  being transferred to cath holding(unit) for routine post - op       Report consisted of patients Situation, Background, Assessment and   Recommendations(SBAR). Information from the following report(s) SBAR, Procedure Summary, and MAR was reviewed with the receiving nurse. Lines:   Venous Access Device [employer] [place] Lot [id number] 12/28/22 Upper chest (subclavicular area, right (Active)        Opportunity for questions and clarification was provided.       Patient transported with:   Registered Nurse

## 2022-12-28 NOTE — H&P
History and Physical    Patient: Nellie Barnes           Sex: female       DOA: 12/28/2022  YOB: 1955      Age:  78 y.o.     LOS:  LOS: 0 days        HPI:     Nellie Barnes is a 78 y.o. female who has history of NSCLC here for a mediport placement with moderate sedation. Past Medical History:   Diagnosis Date    Hypertension     Lung cancer [facility])        Past Surgical History:   Procedure Laterality Date    NEUROLOGICAL PROCEDURE UNLISTED      MD CARDIAC SURG PROCEDURE UNLIST         History reviewed. No pertinent family history. Social History     Socioeconomic History    Marital status:    Tobacco Use    Smoking status: Every Day     Packs/day: 0.25     Types: Cigarettes     Passive exposure: Never    Smokeless tobacco: Never   Vaping Use    Vaping Use: Never used   Substance and Sexual Activity    Alcohol use: Never    Drug use: Never    Sexual activity: Never     Prior to Admission medications    Not on File     No Known Allergies    Physical Exam:      Visit Vitals  Breastfeeding No     Physical Exam:  Mallampati 2 ASA 3  General: A&O x 4, NAD  Heart: RRR  Lungs: Normal work of breathing on room air    Labs Reviewed: All lab results for the last 24 hours reviewed.     Assessment/Plan     The patient is an appropriate candidate to undergo the planned procedure and sedation    MOISES Pineda

## 2022-12-28 NOTE — DISCHARGE INSTRUCTIONS
[address] Implanted Port Discharge Instructions      General Instructions:   A port is like an implanted IV. They are usually ordered for patients who will be getting chemotherapy, but can also be used as an IV for long term antibiotics, large amounts of fluids, and/or blood products. Your blood can be drawn from your port for labs also. Those patients who do not have good veins find the ports convenient as they can get the IV they need with one stick. The port can be used long term, and the care is easy. The device is under the skin, and once the skin heals, care is minimal. All that is required is the nurse who accesses the port will need to flush it with heparinized saline after each use. Ports are usually placed in the chest wall, usually on the right side. But they can be place in the arms and in the abdomen. Home Care Instructions: If your port is in your arm, do not allow blood pressure or other IVs to be place in that arm. Do not allow bra straps or any clothing to rub the skin over the port. Do not bathe or swim until the skin has healed and if the port is accessed. Once it is healed, and when the port is not accessed, it is okay to bathe and swim. Restrict yourself to light activity for the first 5 days after getting the port put in, after that, resume normal activity slowly. You may resume your normal diet and medications. Follow-Up Instructions: Please see your oncologist, or whatever physician ordered the port as he/she has requested of you. Call If: You should call your Physician and/or the Radiology Nurse if you notice redness, pus, swelling, or pain from the area of your incision. Call if you should develop a fever. The nurses who access your port will know to call your doctor if the port does not seem to be working properly. You need to tell the nurses who use the port if you should have any pain or swelling at the site during an infusion.     To Reach Us: Patient Signature:  Date: 12/28/2022  Discharging Nurse: Royal Beena RN      DISCHARGE SUMMARY from Nurse    PATIENT INSTRUCTIONS:    After general anesthesia or intravenous sedation, for 24 hours or while taking prescription Narcotics:  Limit your activities  Do not drive and operate hazardous machinery  Do not make important personal or business decisions  Do  not drink alcoholic beverages  If you have not urinated within 8 hours after discharge, please contact your surgeon on call. Report the following to your surgeon:  Excessive pain, swelling, redness or odor of or around the surgical area  Temperature over 100.5  Nausea and vomiting lasting longer than 4 hours or if unable to take medications  Any signs of decreased circulation or nerve impairment to extremity: change in color, persistent  numbness, tingling, coldness or increase pain  Any questions    What to do at Home:  Recommended activity: Activity as tolerated and no driving for today,     These are general instructions for a healthy lifestyle:    No smoking/ No tobacco products/ Avoid exposure to second hand smoke  Surgeon General's Warning:  Quitting smoking now greatly reduces serious risk to your health. Obesity, smoking, and sedentary lifestyle greatly increases your risk for illness    A healthy diet, regular physical exercise & weight monitoring are important for maintaining a healthy lifestyle    You may be retaining fluid if you have a history of heart failure or if you experience any of the following symptoms:  Weight gain of 3 pounds or more overnight or 5 pounds in a week, increased swelling in our hands or feet or shortness of breath while lying flat in bed. Please call your doctor as soon as you notice any of these symptoms; do not wait until your next office visit. The discharge information has been reviewed with the patient. The patient verbalized understanding.   Discharge medications reviewed with the patient and appropriate educational materials and side effects teaching were provided. ___________________________________________________________________________________________________________________________________[address]

## 2022-12-28 NOTE — PROCEDURES
Interventional Radiology Brief Post Procedure Note     Procedure Performed: Mediport placement under fluoroscopic guidance     : Collins Lowery PA-C     Assistant: None    Attending: Dr. Dontrell Carvajal     Pre-operative Diagnosis: NSCLC requiring chemotherapy treatment     Post-operative Diagnosis: Same      Anesthesia: 1% lidocaine with epi and IV moderate sedation with Versed and Fentanyl administered and monitored by qualified nursing staff. Findings:  - Successful placement of a right infraclavicular single lumen mediport under image guidance  - Catheter tip at the SVC/RA junction  - Please refer to report on PACS for full details  - Mediport is OK for use     Specimens: None     Complications: No immediate     Estimated Blood Loss:  Minimal     Blood transfusions:  None. Implants: Please see PACS report.       Condition: Stable      Disposition: Nursing recovery unit for 1 hour     Impression: Successful 6 Fr single lumen Mediport placement     MOISES Ortiz

## 2022-12-30 ENCOUNTER — HOSPITAL ENCOUNTER (OUTPATIENT)
Dept: PET IMAGING | Age: 67
Discharge: HOME | End: 2022-12-30
Attending: INTERNAL MEDICINE
Payer: MEDICARE

## 2022-12-30 DIAGNOSIS — C34.90 NON-SMALL CELL LUNG CANCER WITH METASTASIS (HCC): ICD-10-CM

## 2022-12-30 DIAGNOSIS — R91.1 LUNG NODULE: ICD-10-CM

## 2022-12-30 PROCEDURE — A9552 F18 FDG: HCPCS

## 2022-12-30 PROCEDURE — 74011000250 HC RX REV CODE- 250: Performed by: INTERNAL MEDICINE

## 2022-12-30 RX ORDER — BARIUM SULFATE 20 MG/ML
450 SUSPENSION ORAL
Status: COMPLETED | OUTPATIENT
Start: 2022-12-30 | End: 2022-12-30

## 2022-12-30 RX ORDER — FLUDEOXYGLUCOSE F-18 200 MCI/ML
10 INJECTION INTRAVENOUS ONCE
Status: COMPLETED | OUTPATIENT
Start: 2022-12-30 | End: 2022-12-30

## 2022-12-30 RX ADMIN — BARIUM SULFATE 450 ML: 20 SUSPENSION ORAL at 14:30

## 2022-12-30 RX ADMIN — FLUDEOXYGLUCOSE F-18 9.54 MILLICURIE: 200 INJECTION INTRAVENOUS at 14:29

## 2023-01-04 ENCOUNTER — HOSPITAL ENCOUNTER (OUTPATIENT)
Dept: INTERVENTIONAL RADIOLOGY/VASCULAR | Age: 68
Discharge: HOME | End: 2023-01-04
Attending: RADIOLOGY
Payer: MEDICARE

## 2023-01-04 PROCEDURE — 99211 OFF/OP EST MAY X REQ PHY/QHP: CPT

## 2023-01-04 NOTE — PROCEDURES
MEDIPORT SITE CHECK    INDICATION: Mediport placement 12/28/22    SUBJECTIVE:  Patient states that her Mediport site has been without pain, redness, or swelling. Denies new nausea, vomiting, fever, chills, cough, or chest pain since procedure. The new mediport has not yet been used; patient reports first planned use is tomorrow. OBJECTIVE:  Mediport in stable position with well approximated wound edges. Mediport site non tender to palpation with Dermabond still covering pocket incision and venotomy sites. No erythema, edema, exudate, or ecchymosis surrounding the sites. IMPRESSION:  Patient appears to be progressing appropriately s/p Mediport placement. Patient given care instructions and told to call our department with any further questions or concerns.     Thank you,  Franki Nguyen PA-C

## 2023-01-12 ENCOUNTER — TRANSCRIBE ORDER (OUTPATIENT)
Dept: SCHEDULING | Age: 68
End: 2023-01-12

## 2023-01-12 DIAGNOSIS — C34.90 LUNG CANCER (HCC): Primary | ICD-10-CM

## 2023-01-14 ENCOUNTER — HOSPITAL ENCOUNTER (OUTPATIENT)
Dept: CT IMAGING | Age: 68
End: 2023-01-14
Attending: FAMILY MEDICINE
Payer: MEDICARE

## 2023-01-14 DIAGNOSIS — C34.90 LUNG CANCER (HCC): ICD-10-CM

## 2023-01-14 PROCEDURE — 74178 CT ABD&PLV WO CNTR FLWD CNTR: CPT

## 2023-01-14 PROCEDURE — 74177 CT ABD & PELVIS W/CONTRAST: CPT

## 2023-01-14 PROCEDURE — 74011000636 HC RX REV CODE- 636

## 2023-01-14 RX ADMIN — IOPAMIDOL 100 ML: 612 INJECTION, SOLUTION INTRAVENOUS at 16:03

## 2023-02-01 ENCOUNTER — HOSPITAL ENCOUNTER (EMERGENCY)
Age: 68
Discharge: HOME | End: 2023-02-01
Payer: MEDICARE

## 2023-02-01 ENCOUNTER — APPOINTMENT (OUTPATIENT)
Dept: GENERAL RADIOLOGY | Age: 68
End: 2023-02-01
Attending: EMERGENCY MEDICINE
Payer: MEDICARE

## 2023-02-01 ENCOUNTER — APPOINTMENT (OUTPATIENT)
Dept: CT IMAGING | Age: 68
End: 2023-02-01
Attending: EMERGENCY MEDICINE
Payer: MEDICARE

## 2023-02-01 VITALS
HEIGHT: 66 IN | HEART RATE: 92 BPM | RESPIRATION RATE: 28 BRPM | WEIGHT: 110 LBS | TEMPERATURE: 97.8 F | DIASTOLIC BLOOD PRESSURE: 66 MMHG | SYSTOLIC BLOOD PRESSURE: 102 MMHG | BODY MASS INDEX: 17.68 KG/M2 | OXYGEN SATURATION: 95 %

## 2023-02-01 DIAGNOSIS — J96.91 RESPIRATORY FAILURE WITH HYPOXIA, UNSPECIFIED CHRONICITY (HCC): ICD-10-CM

## 2023-02-01 DIAGNOSIS — Z71.89 GOALS OF CARE, COUNSELING/DISCUSSION: ICD-10-CM

## 2023-02-01 DIAGNOSIS — E83.39 HYPOPHOSPHATEMIA: ICD-10-CM

## 2023-02-01 DIAGNOSIS — C79.9 METASTATIC MALIGNANT NEOPLASM, UNSPECIFIED SITE (HCC): Primary | ICD-10-CM

## 2023-02-01 LAB
ALBUMIN SERPL-MCNC: 1.9 G/DL (ref 3.4–5)
ALBUMIN/GLOB SERPL: 0.5 (ref 0.8–1.7)
ALP SERPL-CCNC: 264 U/L (ref 45–117)
ALT SERPL-CCNC: 34 U/L (ref 13–56)
ANION GAP SERPL CALC-SCNC: 9 MMOL/L (ref 3–18)
APPEARANCE UR: CLEAR
AST SERPL-CCNC: 63 U/L (ref 10–38)
ATRIAL RATE: 103 BPM
BASOPHILS # BLD: 0 K/UL (ref 0–0.1)
BASOPHILS NFR BLD: 0 % (ref 0–2)
BILIRUB SERPL-MCNC: 1 MG/DL (ref 0.2–1)
BILIRUB UR QL: ABNORMAL
BNP SERPL-MCNC: 228 PG/ML (ref 0–900)
BUN SERPL-MCNC: 15 MG/DL (ref 7–18)
BUN/CREAT SERPL: 31 (ref 12–20)
CALCIUM SERPL-MCNC: 8.1 MG/DL (ref 8.5–10.1)
CALCULATED P AXIS, ECG09: 94 DEGREES
CALCULATED R AXIS, ECG10: 114 DEGREES
CALCULATED T AXIS, ECG11: -176 DEGREES
CHLORIDE SERPL-SCNC: 95 MMOL/L (ref 100–111)
CO2 SERPL-SCNC: 25 MMOL/L (ref 21–32)
COLOR UR: ABNORMAL
CREAT SERPL-MCNC: 0.48 MG/DL (ref 0.6–1.3)
DIAGNOSIS, 93000: NORMAL
DIFFERENTIAL METHOD BLD: ABNORMAL
EOSINOPHIL # BLD: 0 K/UL (ref 0–0.4)
EOSINOPHIL NFR BLD: 0 % (ref 0–5)
EPITH CASTS URNS QL MICRO: ABNORMAL /LPF (ref 0–5)
ERYTHROCYTE [DISTWIDTH] IN BLOOD BY AUTOMATED COUNT: 15.9 % (ref 11.6–14.5)
FLUAV AG NPH QL IA: NEGATIVE
FLUBV AG NOSE QL IA: NEGATIVE
GLOBULIN SER CALC-MCNC: 3.5 G/DL (ref 2–4)
GLUCOSE SERPL-MCNC: 117 MG/DL (ref 74–99)
GLUCOSE UR STRIP.AUTO-MCNC: NEGATIVE MG/DL
HCT VFR BLD AUTO: 24.9 % (ref 35–45)
HEMOCCULT STL QL: NEGATIVE
HGB BLD-MCNC: 8.1 G/DL (ref 12–16)
HGB UR QL STRIP: NEGATIVE
IMM GRANULOCYTES # BLD AUTO: 0.5 K/UL (ref 0–0.04)
IMM GRANULOCYTES NFR BLD AUTO: 3 % (ref 0–0.5)
KETONES UR QL STRIP.AUTO: ABNORMAL MG/DL
LEUKOCYTE ESTERASE UR QL STRIP.AUTO: ABNORMAL
LYMPHOCYTES # BLD: 1.6 K/UL (ref 0.9–3.6)
LYMPHOCYTES NFR BLD: 10 % (ref 21–52)
MAGNESIUM SERPL-MCNC: 1.9 MG/DL (ref 1.6–2.6)
MCH RBC QN AUTO: 30.7 PG (ref 24–34)
MCHC RBC AUTO-ENTMCNC: 32.5 G/DL (ref 31–37)
MCV RBC AUTO: 94.3 FL (ref 78–100)
MONOCYTES # BLD: 1.5 K/UL (ref 0.05–1.2)
MONOCYTES NFR BLD: 9 % (ref 3–10)
MUCOUS THREADS URNS QL MICRO: ABNORMAL /LPF
NEUTS SEG # BLD: 12.7 K/UL (ref 1.8–8)
NEUTS SEG NFR BLD: 78 % (ref 40–73)
NITRITE UR QL STRIP.AUTO: NEGATIVE
NRBC # BLD: 0.11 K/UL (ref 0–0.01)
NRBC BLD-RTO: 0.7 PER 100 WBC
P-R INTERVAL, ECG05: 114 MS
PH UR STRIP: 6 (ref 5–8)
PHOSPHATE SERPL-MCNC: 1.9 MG/DL (ref 2.5–4.9)
PLATELET # BLD AUTO: 50 K/UL (ref 135–420)
PLATELET COMMENTS,PCOM: ABNORMAL
PMV BLD AUTO: 13 FL (ref 9.2–11.8)
POTASSIUM SERPL-SCNC: 3.9 MMOL/L (ref 3.5–5.5)
PROT SERPL-MCNC: 5.4 G/DL (ref 6.4–8.2)
PROT UR STRIP-MCNC: ABNORMAL MG/DL
Q-T INTERVAL, ECG07: 368 MS
QRS DURATION, ECG06: 114 MS
QTC CALCULATION (BEZET), ECG08: 482 MS
RBC # BLD AUTO: 2.64 M/UL (ref 4.2–5.3)
RBC #/AREA URNS HPF: ABNORMAL /HPF (ref 0–5)
RBC MORPH BLD: ABNORMAL
SARS-COV-2 RDRP RESP QL NAA+PROBE: NOT DETECTED
SODIUM SERPL-SCNC: 129 MMOL/L (ref 136–145)
SOURCE, COVRS: NORMAL
SP GR UR REFRACTOMETRY: 1.02 (ref 1–1.03)
TROPONIN I SERPL HS-MCNC: 9 NG/L (ref 0–54)
TSH SERPL DL<=0.05 MIU/L-ACNC: 1.58 UIU/ML (ref 0.36–3.74)
UROBILINOGEN UR QL STRIP.AUTO: 2 EU/DL (ref 0.2–1)
VENTRICULAR RATE, ECG03: 103 BPM
WBC # BLD AUTO: 16.3 K/UL (ref 4.6–13.2)
WBC URNS QL MICRO: ABNORMAL /HPF (ref 0–4)

## 2023-02-01 PROCEDURE — 99285 EMERGENCY DEPT VISIT HI MDM: CPT

## 2023-02-01 PROCEDURE — 82270 OCCULT BLOOD FECES: CPT

## 2023-02-01 PROCEDURE — 83880 ASSAY OF NATRIURETIC PEPTIDE: CPT

## 2023-02-01 PROCEDURE — 74011250636 HC RX REV CODE- 250/636: Performed by: EMERGENCY MEDICINE

## 2023-02-01 PROCEDURE — 74011000636 HC RX REV CODE- 636: Performed by: EMERGENCY MEDICINE

## 2023-02-01 PROCEDURE — 74011000258 HC RX REV CODE- 258: Performed by: EMERGENCY MEDICINE

## 2023-02-01 PROCEDURE — 83735 ASSAY OF MAGNESIUM: CPT

## 2023-02-01 PROCEDURE — 93005 ELECTROCARDIOGRAM TRACING: CPT

## 2023-02-01 PROCEDURE — 87635 SARS-COV-2 COVID-19 AMP PRB: CPT

## 2023-02-01 PROCEDURE — 71045 X-RAY EXAM CHEST 1 VIEW: CPT

## 2023-02-01 PROCEDURE — 80053 COMPREHEN METABOLIC PANEL: CPT

## 2023-02-01 PROCEDURE — 96366 THER/PROPH/DIAG IV INF ADDON: CPT

## 2023-02-01 PROCEDURE — 71275 CT ANGIOGRAPHY CHEST: CPT

## 2023-02-01 PROCEDURE — 96367 TX/PROPH/DG ADDL SEQ IV INF: CPT

## 2023-02-01 PROCEDURE — 70450 CT HEAD/BRAIN W/O DYE: CPT

## 2023-02-01 PROCEDURE — 81001 URINALYSIS AUTO W/SCOPE: CPT

## 2023-02-01 PROCEDURE — 87804 INFLUENZA ASSAY W/OPTIC: CPT

## 2023-02-01 PROCEDURE — 84443 ASSAY THYROID STIM HORMONE: CPT

## 2023-02-01 PROCEDURE — 96365 THER/PROPH/DIAG IV INF INIT: CPT

## 2023-02-01 PROCEDURE — 84484 ASSAY OF TROPONIN QUANT: CPT

## 2023-02-01 PROCEDURE — 85025 COMPLETE CBC W/AUTO DIFF WBC: CPT

## 2023-02-01 PROCEDURE — 84100 ASSAY OF PHOSPHORUS: CPT

## 2023-02-01 PROCEDURE — 74177 CT ABD & PELVIS W/CONTRAST: CPT

## 2023-02-01 RX ORDER — SODIUM,POTASSIUM PHOSPHATES 280-250MG
1 POWDER IN PACKET (EA) ORAL 4 TIMES DAILY
Qty: 100 PACKET | Refills: 0 | Status: SHIPPED | OUTPATIENT
Start: 2023-02-01 | End: 2023-03-03

## 2023-02-01 RX ORDER — AMOXICILLIN AND CLAVULANATE POTASSIUM 875; 125 MG/1; MG/1
1 TABLET, FILM COATED ORAL 2 TIMES DAILY
Qty: 14 TABLET | Refills: 0 | Status: SHIPPED | OUTPATIENT
Start: 2023-02-01

## 2023-02-01 RX ORDER — AZITHROMYCIN 250 MG/1
250 TABLET, FILM COATED ORAL DAILY
Qty: 4 TABLET | Refills: 0 | Status: SHIPPED | OUTPATIENT
Start: 2023-02-01 | End: 2023-02-05

## 2023-02-01 RX ADMIN — SODIUM CHLORIDE 500 ML: 9 INJECTION, SOLUTION INTRAVENOUS at 12:37

## 2023-02-01 RX ADMIN — PIPERACILLIN AND TAZOBACTAM 3.38 G: 3; .375 INJECTION, POWDER, FOR SOLUTION INTRAVENOUS at 14:16

## 2023-02-01 RX ADMIN — AZITHROMYCIN MONOHYDRATE 500 MG: 500 INJECTION, POWDER, LYOPHILIZED, FOR SOLUTION INTRAVENOUS at 12:38

## 2023-02-01 RX ADMIN — IOPAMIDOL 100 ML: 755 INJECTION, SOLUTION INTRAVENOUS at 11:09

## 2023-02-01 NOTE — PROGRESS NOTES
CM Perfect Served that patient will need Home Hospice, and that the [facility] has started the process. Doctor wants to make sure we are good on our end, and that patient will need Oxygen. RADHA called Nando Lima with [facility], [phone] #[phone], CM received voicemail, CM left a message with patient identifiers, and that CM is checking on status of Home Hospice for this patient, and that this patient is currently in ED at [facility]. CM left phone number for a return call. [facility] closes at 4 pm for Hospice, Nando Lima. Hospice agency orders all DME and Oxygen needed for the patient. CM will call Nando Lima back in am, to follow up on this patient.                Anjali Saldana RN  Case Management [phone]

## 2023-02-01 NOTE — ED PROVIDER NOTES
EMERGENCY DEPARTMENT HISTORY AND PHYSICAL EXAM      Date: 2/1/2023  Patient Name: Carmen Block    History of Presenting Illness     Chief Complaint   Patient presents with    Fall       History Provided By: Patient    HPI: Carmen Block, 78 y.o. female with PMHx as noted below presents the emergency department with multiple complaints. Patient complaining of generalized weakness, dyspnea on exertion, fatigue, falls, leg swelling. Patient has history of stage IV lung cancer, currently on palliative chemotherapy under the care of an oncologist.  They noted that her condition has been declining over the last couple weeks, has become too weak to stand and had fallen earlier, no injuries were sustained. Patient reports having significant dyspnea with any exertion as well as some foot swelling bilaterally. They also report some constipation and a given MiraLAX yesterday however had a couple episodes of loose stool so I discontinued. Diarrhea seems to have resolved. Pt denies any other alleviating or exacerbating factors. Additionally, pt specifically denies any recent fever, chills, headache, nausea, vomiting, abdominal pain, CP,umbness, weakness, BLE swelling, heart palpitations, urinary sxs, melena, hematochezia, cough, or congestion. PCP: Jordi, MD Thee        Past History     Past Medical History:  Past Medical History:   Diagnosis Date    Hypertension     Lung cancer ([facility] [address]        Past Surgical History:  Past Surgical History:   Procedure Laterality Date    IR INSERT TUNL CVC W PORT OVER 5 YEARS  12/28/2022    NEUROLOGICAL PROCEDURE UNLISTED      CO CARDIAC SURG PROCEDURE UNLIST         Family History:  No family history on file.     Social History:  Social History     Tobacco Use    Smoking status: Every Day     Packs/day: 0.25     Types: Cigarettes     Passive exposure: Never    Smokeless tobacco: Never   Vaping Use    Vaping Use: Never used   Substance Use Topics    Alcohol use: Never Drug use: Never       Allergies:  No Known Allergies      Review of Systems   Review of Systems  Pertinent positives and negatives in HPI  Physical Exam   Physical Exam    GENERAL: alert and oriented, no acute distress  EYES: PEERL, No injection, discharge or icterus. ENT: Mucous membranes pink and moist.  NECK: Supple  LUNGS: Airway patent. Mildly labored respirations, crackles noted in the bases  HEART: Regular rate and rhythm. Trace peripheral edema  ABDOMEN: Non-distended, diffusely tender without guarding or rebound. SKIN:  warm, dry  MSK/EXTREMITIES: Without swelling, tenderness or deformity, symmetric with normal ROM  NEUROLOGICAL: Alert, oriented      Diagnostic Study Results     Labs -     No results found for this or any previous visit (from the past 12 hour(s)). Radiologic Studies -   CTA CHEST W OR W WO CONT   Final Result           1. No convincing evidence of pulmonary embolism. 2.  Extensive lung masses and miliary nodules. Overall increase in extent even   when compared to 01/14/23. Increased hepatic metastatic lesions compared to   01/14/23. 3.  Moderate right pleural effusion. CT ABD PELV W CONT   Final Result      1. Increased metastatic lesions to the liver. 2.  Increased left adrenal nodule, suspect metastatic lesion. 3.  New vague hypodensities in the spleen, possibly from metastatic lesions. 4.  Renal hypodensities, favor renal cysts. 5.  Chronic right adnexal partially calcified structure. 6.  Paget's disease. 7.  Developing 1 cm lytic focus at T11. Suspect metastatic lesion. CT HEAD WO CONT   Final Result                  1.  Left frontal mass, slightly increased. 2.  Periventricular white matter hypodensities, nonspecific. Probably   suggestive of chronic small vessel ischemic changes.       XR CHEST PORT   Final Result   Dominant right hilar masses and bilateral spiculated metastases are overall   similar to the 1/14/2023 chest CT. Similar extensive interstitial opacities and small right pleural effusion. No   definite new superimposed acute finding. CT Results  (Last 48 hours)                 02/01/23 1108  CTA CHEST W OR W WO CONT Final result    Impression:          1. No convincing evidence of pulmonary embolism. 2.  Extensive lung masses and miliary nodules. Overall increase in extent even   when compared to 01/14/23. Increased hepatic metastatic lesions compared to   01/14/23. 3.  Moderate right pleural effusion. Narrative:  EXAM:  CTA Chest with Contrast (Study for PE). CLINICAL INDICATION:         - Shortness of breath. - Lung CA with brain mets. COMPARISON:  01/14/23              TECHNIQUE:        - Helical volumetric sections of the chest are obtained with CT pulmonary   angiogram protocol. Subsequently, sagittal and coronal multiplanar   reconstruction images are obtained. Maximum intensity projection images are   generated to better delineate the pulmonary vasculature, differentiate between   the pulmonary arteries and veins and to increase sensitivity to pulmonary   emboli.     - IV contrast dose 100 mL Isovue-370.   - Radiation dose optimization techniques are utilized as appropriate to the   exam, with combination of automated exposure control, adjustment of the mA   and/or kV according to patient's size or use of iterative reconstruction   technique. FINDINGS:        Pulmonary Arteries:         - The pulmonary artery opacification is diagnostic in quality.   - No pulmonary emboli are noted in the pulmonary arterial tree down to the level   of the segmental arteries. As noted, the right upper lobar segmental arteries   appear markedly decreased in caliber due to the compressive effects exerted by   the right upper lobe mass.        Lung, Pleura, Airways:         - Increased right upper lobe mass now measuring about 3.9 x 3.7 cm (vs. 3.7 x   3.2 cm on 01/14/23. In addition to this mass, there are multiple lung masses of   varying sizes appear increased in overall extent. Additionally, there are   numerous micronodular densities in both lungs, increased in overall extent   compared to 01/14/23.   - Moderate right pleural effusion. Mediastinum:  Right paratracheal/ pericarinal node measuring up to about 2.4 x   1.7 cm, similar to previous study. This is in turn associated with increased   extent of the right hilar mass and perihilar consolidative/atelectatic changes. Aorta:  No evidence of aortic dissection or aneurysm. Base of Neck, Chest Wall, Axillae:  No adenopathy. Upper Abdomen:  Multiple hepatic masses of varying sizes. Increase in overall   extent compared to 01/14/23. The largest focal area of mass appears to involve   the right lobe of the liver, encompassing an area of about 5.8 x 4.9 cm (axial   #199). The mass in this region measured only about 1.9 x 1.7 cm on 01/14/23. Skeletal Structures:  Compression deformities at T5 and T7, similar to 01/14/23.              02/01/23 1108  CT ABD PELV W CONT Final result    Impression:      1. Increased metastatic lesions to the liver. 2.  Increased left adrenal nodule, suspect metastatic lesion. 3.  New vague hypodensities in the spleen, possibly from metastatic lesions. 4.  Renal hypodensities, favor renal cysts. 5.  Chronic right adnexal partially calcified structure. 6.  Paget's disease. 7.  Developing 1 cm lytic focus at T11. Suspect metastatic lesion. Narrative:  EXAM:  CT Abdomen-Pelvis with Contrast.       CLINICAL INDICATION:  Abdominal pain with diarrhea. Currently being treated   with chemotherapy. COMPARISON:  01/14/23, 11/09/22       TECHNIQUE:         - Helical volumetric CT imaging of the abdomen and pelvis is performed following   IV contrast administration.   Coronal and sagittal multiplanar reconstruction   images are generated for improved anatomic delineation.   - IV contrast 100 mL Isovue-300. - All CT scans at this facility are performed using dose optimization technique   as appropriate to the performed exam, to include automated exposure control,   adjustment of the mA and/or kV according to patient's size or use of iterative   reconstruction technique. FINDINGS:       Liver:  Multiple scattered hepatic masses of varying sizes are noted. The   overall extent of the hepatic masses is distinctly increased compared to prior   studies, even when compared to 01/14/23. The largest mass in the liver is   currently estimated at 5.1 x 4.9 cm (axial #40). This mass was about 2.2 x 1.5   cm on 01/14/23. Spleen:  Multiple new vague hypodensities, potentially suggestive of splenic   metastatic lesions. Gallbladder, Pancreas:  Negative. Adrenal Glands:  Increased left adrenal gland nodule now measuring 1.4 x 1.1 cm,   previously 1.0 x 0.8 cm on 01/14/23. Stable small right adrenal gland nodule. Kidneys:  Small cortical renal hypodensities, favor renal cysts. No solid renal   mass. GI Tract:  Unremarkable stomach and small bowel. The appendix is normal.  No   acute colon abnormalities. Urinary Bladder:  Contracted. Pelvic Organs:  No acute abnormalities. Atrophied uterus with calcified   fibroid. Right posterior adnexal/ perirectal region with chronic calcified   structure, unchanged compared to prior studies dating back to at least 11/07/22. Vascular:  No acute aortic abnormalities. Moderate to pronounced   atherosclerotic calcifications. Nodes:  No mesenteric or retroperitoneal adenopathy. Bones:  Paget's disease of the right iliac bone. Lytic 1.0 cm focus at T11   superior anterior aspect (sagittal #37) No acute bony abnormalities otherwise.            02/01/23 1108  CT HEAD WO CONT Final result    Impression:                 1. Left frontal mass, slightly increased. 2.  Periventricular white matter hypodensities, nonspecific. Probably   suggestive of chronic small vessel ischemic changes. Narrative:  EXAM:  CT Head without Contrast                CLINICAL INDICATION:         - Weakness, falls. - Lung CA with brain mets. COMPARISON:  12/11/22. TECHNIQUE:         - Helical volumetric CT imaging of the head is performed from the base of the   skull to the vertex without IV contrast administration. Axial, coronal and   sagittal reconstruction images are generated from this volumetric data set. - Dose optimization techniques are utilized as appropriate to the performed exam   with combination of automated exposure control, adjustment of mA and/or kV   according to patient size, and use of iterative reconstructive technique. FINDINGS:        Brain:       - 1.8 x 1.6 cm left frontal vague isodense to slightly hypodense masslike   structure with associated vasogenic edema (axial #15). Previously 1.3 x 1.2 cm.   - Minimal to mild periventricular white matter hypodensities. - No hemorrhage or convincing evidence of acute vascular territorial infarct. CSF spaces:  No acute ventricular abnormalities. Calvarium:  Intact. Sinuses:  Clear. CXR Results  (Last 48 hours)                 02/01/23 0858  XR CHEST PORT Final result    Impression:  Dominant right hilar masses and bilateral spiculated metastases are overall   similar to the 1/14/2023 chest CT. Similar extensive interstitial opacities and small right pleural effusion. No   definite new superimposed acute finding. Narrative:  EXAM:  XR CHEST PORT       INDICATION: sob       COMPARISON: CT chest January 14, 2023 and chest radiograph November 10, 2022.        FINDINGS: Dominant right hilar masses are overall similar to the recent chest CT   and appear increased in size from the chest radiograph November 2022. Numerous   scattered bilateral spiculated opacities are better characterized on recent   cross-sectional imaging. Extensive interstitial opacities similar to prior. Small right pleural effusion. Right IJ approach subcutaneous port catheter   terminates at the superior cavoatrial junction. Normal heart size. Redemonstrated midthoracic compression deformities. Medical Decision Making       I reviewed the vital signs, available nursing notes, past medical history, past surgical history, family history and social history. Vital Signs-Reviewed the patient's vital signs. Provider Notes (Medical Decision Making): On presentation, the patient is chronically ill-appearing, labored respirations, hypoxic on room air. Differential considered was broad and included worsening cancer burden, pneumonia, PE, anemia, GI bleed, electrolyte abnormality, dehydration, metabolic abnormality. Work-up was notable for significant worsening metastatic disease now causing respiratory failure, inability to ambulate and perform her ADLs. Also concern for possible sepsis, uncertain cause at this time so it started antibiotics. Had a long goals of care discussion with family and patient would like to pursue hospice care at this time, would no longer like to receive any treatment or curative measures. I discussed with both the patient's primary care provider Dr. Td Ardon as well as her oncologist Dr. Joanne Mendoza who both agree that hospice care would be appropriate. Consulted with case management who was able to arrange hospice to be set up this afternoon and patient's wishes are to be discharged today as she would not like to be admitted to the hospital.  She is aware that she may be suffering from potentially life threatening infection as well and patient is okay with this and would still like to opt to go home.   Family was also present for discussion and agrees with plans for hospice care and comfort measures at this time. ED Course:   Initial assessment performed. The patients presenting problems have been discussed, and they are in agreement with the care plan formulated and outlined with them. I have encouraged them to ask questions as they arise throughout their visit. Patient was given the following medications:  Medications   iopamidoL (ISOVUE-370) 370 mg iodine /mL (76 %) injection  mL (100 mL IntraVENous Given 2/1/23 1109)   piperacillin-tazobactam (ZOSYN) 3.375 g in 0.9% sodium chloride (MBP/ADV) 100 mL MBP (0 g IntraVENous Stopped 2/1/23 1515)   azithromycin (ZITHROMAX) 500 mg in 0.9% sodium chloride 250 mL (VIAL-MATE) (0 mg IntraVENous Stopped 2/1/23 1415)   sodium chloride 0.9 % bolus infusion 500 mL (0 mL IntraVENous Stopped 2/1/23 1425)            PROGRESS  Mayra Cortes's  results have been reviewed with her. She has been counseled regarding her diagnosis. She verbally conveys understanding and agreement of the signs, symptoms, diagnosis, treatment and prognosis and additionally agrees to follow up as recommended with Thee Turner MD in 24 - 48 hours. She also agrees with the care-plan and conveys that all of her questions have been answered. I have also put together some discharge instructions for her that include: 1) educational information regarding their diagnosis, 2) how to care for their diagnosis at home, as well a 3) list of reasons why they would want to return to the ED prior to their follow-up appointment, should their condition change.     Critical Care Note     IMPENDING DETERIORATION -Respiratory, Cardiovascular, and CNS  ASSOCIATED RISK FACTORS - Hypotension, Metabolic changes, Dehydration, Vascular Compromise, and CNS Decompensation  MANAGEMENT- Bedside Assessment and Supervision of Care  INTERPRETATION -  Xrays, CT Scan, ECG, and Blood Pressure  INTERVENTIONS -supplemental oxygen titration, extensive goals of care discussion  CASE REVIEW - Family and PCP  TREATMENT RESPONSE -Improved  PERFORMED BY - Self    NOTES   :  I have provided a total of 90 minutes of critical time not including time spent on separately documented procedures. The reason for providing this level of medical care for this critically ill patient was due to a critical illness that impaired one or more vital organ systems such that there was a high probability of imminent or life threatening deterioration in the patients condition. This care involved high complexity decision making to assess, manipulate, and support vital system functions,  lab review, consultations with specialist, family decision- making, bedside attention and documentation. During this entire length of time I was immediately available to the patient        Disposition:  home    PLAN:  1. Discharge Medication List as of 2/1/2023  4:03 PM        START taking these medications    Details   potassium, sodium phosphates (NEUTRA-PHOS) 280-160-250 mg packet Take 1 Packet by mouth four (4) times daily for 30 days. , Normal, Disp-100 Packet, R-0      amoxicillin-clavulanate (Augmentin) 875-125 mg per tablet Take 1 Tablet by mouth two (2) times a day., Normal, Disp-14 Tablet, R-0      azithromycin (ZITHROMAX) 250 mg tablet Take 1 Tablet by mouth daily for 4 days. , Normal, Disp-4 Tablet, R-0           2. Follow-up Information       Follow [facility] Special[facility] Details [facility]  If symptoms worsen [address]  [phone]          Return to ED if worse     Diagnosis     Clinical Impression:   1. Metastatic malignant neoplasm, unspecified site ([address]    2. Respiratory failure with hypoxia, unspecified chronicity ([address]    3. Hypophosphatemia    4. Goals of care, counseling/discussion          Feliz Lainez MD am the first provider for this patient and am the attending of record for this patient encounter.     Jluis Perez MD        Please note that this dictation was completed with Dragon, computer voice recognition software. Quite often unanticipated grammatical, syntax, homophones, and other interpretive errors are inadvertently transcribed by the computer software. Please disregard these errors. Additionally, please excuse any errors that have escaped final proofreading.

## 2023-02-01 NOTE — ED TRIAGE NOTES
Patient is brought by daughter from home for concerns off weakness. For about two weeks, patient has been getting weaker and is now only able to walk short distances. Patient has stage 4 lung cancer that has spread. Patient fell last night but is not complaining of any acute pain from that fall. Denies hitting her head. Patient has chronic low back pain. Patient was constipated and took Miralax at home. Now has diarrhea. NAD noted.